# Patient Record
Sex: FEMALE | Race: BLACK OR AFRICAN AMERICAN | Employment: UNEMPLOYED | URBAN - METROPOLITAN AREA
[De-identification: names, ages, dates, MRNs, and addresses within clinical notes are randomized per-mention and may not be internally consistent; named-entity substitution may affect disease eponyms.]

---

## 2018-08-29 ENCOUNTER — HOSPITAL ENCOUNTER (EMERGENCY)
Age: 28
Discharge: HOME | End: 2018-08-29
Attending: EMERGENCY MEDICINE
Payer: COMMERCIAL

## 2018-08-29 VITALS
DIASTOLIC BLOOD PRESSURE: 65 MMHG | HEIGHT: 58 IN | SYSTOLIC BLOOD PRESSURE: 132 MMHG | RESPIRATION RATE: 17 BRPM | HEART RATE: 101 BPM | WEIGHT: 212 LBS | BODY MASS INDEX: 44.5 KG/M2 | TEMPERATURE: 98.6 F | OXYGEN SATURATION: 100 %

## 2018-08-29 DIAGNOSIS — M62.830 MUSCLE SPASM OF BACK: ICD-10-CM

## 2018-08-29 DIAGNOSIS — J45.21 EXACERBATION OF INTERMITTENT ASTHMA, UNSPECIFIED ASTHMA SEVERITY: Primary | ICD-10-CM

## 2018-08-29 PROCEDURE — 99283 EMERGENCY DEPT VISIT LOW MDM: CPT

## 2018-08-29 RX ORDER — BENZONATATE 100 MG/1
100 CAPSULE ORAL 3 TIMES DAILY PRN
Qty: 21 CAPSULE | Refills: 1 | Status: SHIPPED | OUTPATIENT
Start: 2018-08-29 | End: 2019-08-26

## 2018-08-29 RX ORDER — ALBUTEROL SULFATE 90 UG/1
2 AEROSOL, METERED RESPIRATORY (INHALATION) EVERY 6 HOURS PRN
Qty: 1 INHALER | Refills: 1 | Status: SHIPPED | OUTPATIENT
Start: 2018-08-29

## 2018-08-29 RX ORDER — IBUPROFEN 600 MG/1
600 TABLET ORAL EVERY 8 HOURS PRN
Qty: 20 TABLET | Refills: 0 | Status: SHIPPED | OUTPATIENT
Start: 2018-08-29 | End: 2019-08-26

## 2018-08-29 ASSESSMENT — PAIN SCALES - GENERAL: PAINLEVEL_OUTOF10: 5

## 2018-08-29 ASSESSMENT — PAIN DESCRIPTION - DESCRIPTORS: DESCRIPTORS: SPASM

## 2018-08-29 ASSESSMENT — PAIN DESCRIPTION - LOCATION: LOCATION: BACK

## 2018-08-29 ASSESSMENT — PAIN DESCRIPTION - PAIN TYPE: TYPE: ACUTE PAIN

## 2018-08-29 NOTE — ED TRIAGE NOTES
Cough with clear sputum. States feels like her chest is tight x3 days and feels like she needs her inhaler but she ran out >1 year ago. S/s worse when laying flat. C/o back spasms. Pt states this is the same s/s as when she has asthma attacks. It wakes her up in the middle of the night to where she has to drink water.

## 2018-08-31 NOTE — ED PROVIDER NOTES
TRIAGE CHIEF COMPLAINT:   Chief Complaint   Patient presents with    Asthma    Cough         HPI: Abdiel Gongora is a 29 y.o. female who presents to the Emergency Department with complaint of Dry cough for 3 days. She thinks her asthma is flaring up. Occasional shortness of breath. She states her breathing is slightly tight but she has no chest pain. She does complain of some upper back muscle spasms which come and go. She has no current symptoms. She states her symptoms are typically worse at night. She has been out of her rescue inhaler for over a year. She does not take any regular medications for her asthma. Denies sore throat or earache. She has no abdominal pain and vomiting or bowel complaint. REVIEW OF SYSTEMS:  6 systems reviewed. Pertinent positives per HPI. Otherwise noted to be negative. Nursing notes reviewed and agree with above. Past medical/surgical history reviewed. ALLERGIES   Allergies   Allergen Reactions    Norco [Hydrocodone-Acetaminophen] Hives    Pcn [Penicillins]     Sulfa Antibiotics     Sulfa Antibiotics Hives    Pcn [Penicillins] Rash         /65   Pulse 101   Temp 98.6 °F (37 °C) (Oral)   Resp 17   Ht 4' 10\" (1.473 m)   Wt 96.2 kg (212 lb)   LMP 07/26/2018   SpO2 100%   BMI 44.31 kg/m²   General:  No acute distress. Non toxic appearance  Head:   Normocephalic and atraumatic  Eyes:   Conjunctiva clear, EOM's intact. Sclera anicteric. ENT:   Mucous membranes moist.  TMs are normal.  No sinus tenderness. Oropharynx is clear. Neck:   Supple. No adenopathy or jugular venous distension  Lungs/Chest:  No respiratory distress  CVS:   Regular rate and rhythm  Abdomen:  Nontender  Extremities:  Full range of motion  Skin:   No rashes or lesions to exposed skin  Back:   Mild bilateral trapezius tenderness to palpation. No midline spinal, CVA or rib tenderness. Neuro:  Alert and OX3. Speech clear. No focal weakness.         Gait normal.  Psych:   Affect normal. Mood normal        RADIOLOGY:  NA    LAB      ED COURSE / MDM:  72-year-old female with history of asthma who has URI symptoms and slight asthma exacerbation. She does have some bilateral upper back muscle tenderness. Currently asymptomatic. She is afebrile. No respiratory distress. No chest pain. No clinical evidence for otitis media, strep throat or pneumonia. She was given a refill of her rescue inhaler along with Tessalon for cough and ibuprofen for muscle pain. I discussed with Destiny Armenta the results of evaluation in the Emergency Department, diagnosis, care and prognosis. The plan is to discharge to home. The patient is in agreement with the plan and questions have been answered. I also discussed with Destiny Armenta the reasons which may require a return visit and the importance of follow-up care.         FINAL IMPRESSION:  1 -- Asthma exacerbation  2 -- Muscle spasm of the back                  Roseanna Skiff, MD  08/31/18 2912

## 2018-11-17 ENCOUNTER — HOSPITAL ENCOUNTER (EMERGENCY)
Age: 28
Discharge: HOME | End: 2018-11-17
Payer: COMMERCIAL

## 2018-11-17 VITALS
WEIGHT: 205 LBS | SYSTOLIC BLOOD PRESSURE: 114 MMHG | OXYGEN SATURATION: 97 % | BODY MASS INDEX: 41.33 KG/M2 | HEIGHT: 59 IN | RESPIRATION RATE: 16 BRPM | HEART RATE: 85 BPM | TEMPERATURE: 98.8 F | DIASTOLIC BLOOD PRESSURE: 70 MMHG

## 2018-11-17 DIAGNOSIS — N12 PYELONEPHRITIS: Primary | ICD-10-CM

## 2018-11-17 LAB
A/G RATIO: 1.1 (ref 1.1–2.2)
ALBUMIN SERPL-MCNC: 4.3 G/DL (ref 3.4–5)
ALP BLD-CCNC: 58 U/L (ref 40–129)
ALT SERPL-CCNC: 9 U/L (ref 10–40)
ANION GAP SERPL CALCULATED.3IONS-SCNC: 8 MMOL/L (ref 3–16)
AST SERPL-CCNC: 12 U/L (ref 15–37)
BACTERIA: ABNORMAL /HPF
BASOPHILS ABSOLUTE: 0.1 K/UL (ref 0–0.2)
BASOPHILS RELATIVE PERCENT: 0.9 %
BILIRUB SERPL-MCNC: 0.4 MG/DL (ref 0–1)
BILIRUBIN URINE: NEGATIVE
BLOOD, URINE: NEGATIVE
BUN BLDV-MCNC: 13 MG/DL (ref 7–20)
CALCIUM SERPL-MCNC: 9.1 MG/DL (ref 8.3–10.6)
CHLORIDE BLD-SCNC: 103 MMOL/L (ref 99–110)
CLARITY: ABNORMAL
CO2: 25 MMOL/L (ref 21–32)
COLOR: YELLOW
COMMENT UA: ABNORMAL
CREAT SERPL-MCNC: 0.6 MG/DL (ref 0.6–1.1)
EOSINOPHILS ABSOLUTE: 0.1 K/UL (ref 0–0.6)
EOSINOPHILS RELATIVE PERCENT: 0.9 %
EPITHELIAL CELLS, UA: 3 /HPF (ref 0–5)
GFR AFRICAN AMERICAN: >60
GFR NON-AFRICAN AMERICAN: >60
GLOBULIN: 4 G/DL
GLUCOSE BLD-MCNC: 87 MG/DL (ref 70–99)
GLUCOSE URINE: NEGATIVE MG/DL
HCG(URINE) PREGNANCY TEST: NEGATIVE
HCT VFR BLD CALC: 37.9 % (ref 36–48)
HEMOGLOBIN: 12.9 G/DL (ref 12–16)
KETONES, URINE: ABNORMAL MG/DL
LEUKOCYTE ESTERASE, URINE: ABNORMAL
LIPASE: 19 U/L (ref 13–60)
LYMPHOCYTES ABSOLUTE: 2.8 K/UL (ref 1–5.1)
LYMPHOCYTES RELATIVE PERCENT: 42.6 %
MCH RBC QN AUTO: 31.6 PG (ref 26–34)
MCHC RBC AUTO-ENTMCNC: 34.1 G/DL (ref 31–36)
MCV RBC AUTO: 92.8 FL (ref 80–100)
MICROSCOPIC EXAMINATION: YES
MONOCYTES ABSOLUTE: 0.4 K/UL (ref 0–1.3)
MONOCYTES RELATIVE PERCENT: 5.7 %
MUCUS: ABNORMAL /LPF
NEUTROPHILS ABSOLUTE: 3.2 K/UL (ref 1.7–7.7)
NEUTROPHILS RELATIVE PERCENT: 49.9 %
NITRITE, URINE: NEGATIVE
PDW BLD-RTO: 13.6 % (ref 12.4–15.4)
PH UA: 6
PLATELET # BLD: 255 K/UL (ref 135–450)
PMV BLD AUTO: 8.5 FL (ref 5–10.5)
POTASSIUM REFLEX MAGNESIUM: 4 MMOL/L (ref 3.5–5.1)
PROTEIN UA: ABNORMAL MG/DL
RBC # BLD: 4.08 M/UL (ref 4–5.2)
RBC UA: 4 /HPF (ref 0–4)
SODIUM BLD-SCNC: 136 MMOL/L (ref 136–145)
SPECIFIC GRAVITY UA: >1.03
TOTAL PROTEIN: 8.3 G/DL (ref 6.4–8.2)
URINE REFLEX TO CULTURE: YES
URINE TYPE: ABNORMAL
UROBILINOGEN, URINE: 1 E.U./DL
WBC # BLD: 6.5 K/UL (ref 4–11)
WBC UA: 60 /HPF (ref 0–5)

## 2018-11-17 PROCEDURE — 80053 COMPREHEN METABOLIC PANEL: CPT

## 2018-11-17 PROCEDURE — 6360000002 HC RX W HCPCS: Performed by: PHYSICIAN ASSISTANT

## 2018-11-17 PROCEDURE — 85025 COMPLETE CBC W/AUTO DIFF WBC: CPT

## 2018-11-17 PROCEDURE — 84703 CHORIONIC GONADOTROPIN ASSAY: CPT

## 2018-11-17 PROCEDURE — 87086 URINE CULTURE/COLONY COUNT: CPT

## 2018-11-17 PROCEDURE — 96374 THER/PROPH/DIAG INJ IV PUSH: CPT

## 2018-11-17 PROCEDURE — 2500000003 HC RX 250 WO HCPCS: Performed by: PHYSICIAN ASSISTANT

## 2018-11-17 PROCEDURE — 96361 HYDRATE IV INFUSION ADD-ON: CPT

## 2018-11-17 PROCEDURE — 96375 TX/PRO/DX INJ NEW DRUG ADDON: CPT

## 2018-11-17 PROCEDURE — 83690 ASSAY OF LIPASE: CPT

## 2018-11-17 PROCEDURE — 6370000000 HC RX 637 (ALT 250 FOR IP): Performed by: PHYSICIAN ASSISTANT

## 2018-11-17 PROCEDURE — 99283 EMERGENCY DEPT VISIT LOW MDM: CPT

## 2018-11-17 PROCEDURE — 2580000003 HC RX 258: Performed by: PHYSICIAN ASSISTANT

## 2018-11-17 PROCEDURE — 81001 URINALYSIS AUTO W/SCOPE: CPT

## 2018-11-17 RX ORDER — ONDANSETRON 4 MG/1
4-8 TABLET, ORALLY DISINTEGRATING ORAL EVERY 12 HOURS PRN
Qty: 12 TABLET | Refills: 0 | Status: SHIPPED | OUTPATIENT
Start: 2018-11-17

## 2018-11-17 RX ORDER — KETOROLAC TROMETHAMINE 30 MG/ML
30 INJECTION, SOLUTION INTRAMUSCULAR; INTRAVENOUS ONCE
Status: COMPLETED | OUTPATIENT
Start: 2018-11-17 | End: 2018-11-17

## 2018-11-17 RX ORDER — 0.9 % SODIUM CHLORIDE 0.9 %
1000 INTRAVENOUS SOLUTION INTRAVENOUS ONCE
Status: COMPLETED | OUTPATIENT
Start: 2018-11-17 | End: 2018-11-17

## 2018-11-17 RX ORDER — PHENAZOPYRIDINE HYDROCHLORIDE 100 MG/1
200 TABLET, FILM COATED ORAL ONCE
Status: COMPLETED | OUTPATIENT
Start: 2018-11-17 | End: 2018-11-17

## 2018-11-17 RX ORDER — TRAMADOL HYDROCHLORIDE 50 MG/1
50 TABLET ORAL EVERY 4 HOURS PRN
Qty: 20 TABLET | Refills: 0 | Status: SHIPPED | OUTPATIENT
Start: 2018-11-17 | End: 2018-11-27

## 2018-11-17 RX ORDER — ONDANSETRON 2 MG/ML
4 INJECTION INTRAMUSCULAR; INTRAVENOUS ONCE
Status: COMPLETED | OUTPATIENT
Start: 2018-11-17 | End: 2018-11-17

## 2018-11-17 RX ORDER — CEFUROXIME AXETIL 500 MG/1
500 TABLET ORAL 2 TIMES DAILY
Qty: 20 TABLET | Refills: 0 | Status: SHIPPED | OUTPATIENT
Start: 2018-11-17 | End: 2018-11-27

## 2018-11-17 RX ADMIN — KETOROLAC TROMETHAMINE 30 MG: 30 INJECTION, SOLUTION INTRAMUSCULAR at 14:36

## 2018-11-17 RX ADMIN — ONDANSETRON 4 MG: 2 INJECTION INTRAMUSCULAR; INTRAVENOUS at 13:47

## 2018-11-17 RX ADMIN — PHENAZOPYRIDINE HYDROCHLORIDE 200 MG: 100 TABLET ORAL at 13:39

## 2018-11-17 RX ADMIN — SODIUM CHLORIDE 1000 ML: 9 INJECTION, SOLUTION INTRAVENOUS at 13:47

## 2018-11-17 RX ADMIN — CEFTRIAXONE 1 G: 10 INJECTION, POWDER, FOR SOLUTION INTRAVENOUS at 14:43

## 2018-11-17 ASSESSMENT — ENCOUNTER SYMPTOMS
COLOR CHANGE: 0
VOMITING: 1
NAUSEA: 1
ABDOMINAL PAIN: 1
DIARRHEA: 0
CONSTIPATION: 0
SHORTNESS OF BREATH: 0
BACK PAIN: 1

## 2018-11-17 ASSESSMENT — PAIN SCALES - GENERAL
PAINLEVEL_OUTOF10: 9
PAINLEVEL_OUTOF10: 8

## 2018-11-17 ASSESSMENT — PAIN DESCRIPTION - LOCATION: LOCATION: ABDOMEN;HEAD

## 2018-11-17 ASSESSMENT — PAIN DESCRIPTION - PAIN TYPE: TYPE: ACUTE PAIN

## 2018-11-18 LAB — URINE CULTURE, ROUTINE: NORMAL

## 2018-11-28 ENCOUNTER — APPOINTMENT (OUTPATIENT)
Dept: GENERAL RADIOLOGY | Age: 28
End: 2018-11-28
Payer: COMMERCIAL

## 2018-11-28 ENCOUNTER — APPOINTMENT (OUTPATIENT)
Dept: CT IMAGING | Age: 28
End: 2018-11-28
Payer: COMMERCIAL

## 2018-11-28 ENCOUNTER — HOSPITAL ENCOUNTER (EMERGENCY)
Age: 28
Discharge: HOME | End: 2018-11-28
Attending: EMERGENCY MEDICINE
Payer: COMMERCIAL

## 2018-11-28 VITALS
BODY MASS INDEX: 41.33 KG/M2 | OXYGEN SATURATION: 100 % | WEIGHT: 205 LBS | RESPIRATION RATE: 18 BRPM | HEIGHT: 59 IN | DIASTOLIC BLOOD PRESSURE: 76 MMHG | HEART RATE: 101 BPM | SYSTOLIC BLOOD PRESSURE: 131 MMHG | TEMPERATURE: 98.3 F

## 2018-11-28 DIAGNOSIS — R10.30 LOWER ABDOMINAL PAIN: Primary | ICD-10-CM

## 2018-11-28 DIAGNOSIS — R11.2 NON-INTRACTABLE VOMITING WITH NAUSEA, UNSPECIFIED VOMITING TYPE: ICD-10-CM

## 2018-11-28 DIAGNOSIS — R09.82 PND (POST-NASAL DRIP): ICD-10-CM

## 2018-11-28 DIAGNOSIS — N39.0 URINARY TRACT INFECTION IN FEMALE: ICD-10-CM

## 2018-11-28 LAB
A/G RATIO: 0.9 (ref 1.1–2.2)
ALBUMIN SERPL-MCNC: 4.2 G/DL (ref 3.4–5)
ALP BLD-CCNC: 68 U/L (ref 40–129)
ALT SERPL-CCNC: 11 U/L (ref 10–40)
ANION GAP SERPL CALCULATED.3IONS-SCNC: 11 MMOL/L (ref 3–16)
AST SERPL-CCNC: 16 U/L (ref 15–37)
BACTERIA: ABNORMAL /HPF
BASOPHILS ABSOLUTE: 0.1 K/UL (ref 0–0.2)
BASOPHILS RELATIVE PERCENT: 0.9 %
BILIRUB SERPL-MCNC: 0.3 MG/DL (ref 0–1)
BILIRUBIN URINE: NEGATIVE
BLOOD, URINE: NEGATIVE
BUN BLDV-MCNC: 10 MG/DL (ref 7–20)
CALCIUM SERPL-MCNC: 9.7 MG/DL (ref 8.3–10.6)
CASTS: ABNORMAL /LPF
CHLORIDE BLD-SCNC: 101 MMOL/L (ref 99–110)
CLARITY: ABNORMAL
CO2: 25 MMOL/L (ref 21–32)
COLOR: YELLOW
CREAT SERPL-MCNC: 0.7 MG/DL (ref 0.6–1.1)
EOSINOPHILS ABSOLUTE: 0.1 K/UL (ref 0–0.6)
EOSINOPHILS RELATIVE PERCENT: 1.8 %
EPITHELIAL CELLS, UA: 3 /HPF (ref 0–5)
GFR AFRICAN AMERICAN: >60
GFR NON-AFRICAN AMERICAN: >60
GLOBULIN: 4.6 G/DL
GLUCOSE BLD-MCNC: 93 MG/DL (ref 70–99)
GLUCOSE URINE: NEGATIVE MG/DL
HCG(URINE) PREGNANCY TEST: NEGATIVE
HCT VFR BLD CALC: 40.4 % (ref 36–48)
HEMOGLOBIN: 13.6 G/DL (ref 12–16)
KETONES, URINE: NEGATIVE MG/DL
LEUKOCYTE ESTERASE, URINE: ABNORMAL
LIPASE: 20 U/L (ref 13–60)
LYMPHOCYTES ABSOLUTE: 3.8 K/UL (ref 1–5.1)
LYMPHOCYTES RELATIVE PERCENT: 48.6 %
MCH RBC QN AUTO: 31.1 PG (ref 26–34)
MCHC RBC AUTO-ENTMCNC: 33.6 G/DL (ref 31–36)
MCV RBC AUTO: 92.8 FL (ref 80–100)
MICROSCOPIC EXAMINATION: YES
MONOCYTES ABSOLUTE: 0.4 K/UL (ref 0–1.3)
MONOCYTES RELATIVE PERCENT: 5.6 %
NEUTROPHILS ABSOLUTE: 3.3 K/UL (ref 1.7–7.7)
NEUTROPHILS RELATIVE PERCENT: 43.1 %
NITRITE, URINE: NEGATIVE
PDW BLD-RTO: 13.5 % (ref 12.4–15.4)
PH UA: 6
PLATELET # BLD: 257 K/UL (ref 135–450)
PLATELET SLIDE REVIEW: ADEQUATE
PMV BLD AUTO: 9.5 FL (ref 5–10.5)
POTASSIUM SERPL-SCNC: 4.6 MMOL/L (ref 3.5–5.1)
PROTEIN UA: NEGATIVE MG/DL
RBC # BLD: 4.35 M/UL (ref 4–5.2)
RBC # BLD: NORMAL 10*6/UL
RBC UA: 3 /HPF (ref 0–4)
SLIDE REVIEW: NORMAL
SODIUM BLD-SCNC: 137 MMOL/L (ref 136–145)
SPECIFIC GRAVITY UA: 1.03
TOTAL PROTEIN: 8.8 G/DL (ref 6.4–8.2)
URINE REFLEX TO CULTURE: YES
URINE TYPE: ABNORMAL
UROBILINOGEN, URINE: 1 E.U./DL
WBC # BLD: 7.7 K/UL (ref 4–11)
WBC UA: 52 /HPF (ref 0–5)

## 2018-11-28 PROCEDURE — 74176 CT ABD & PELVIS W/O CONTRAST: CPT

## 2018-11-28 PROCEDURE — 96374 THER/PROPH/DIAG INJ IV PUSH: CPT

## 2018-11-28 PROCEDURE — 81001 URINALYSIS AUTO W/SCOPE: CPT

## 2018-11-28 PROCEDURE — 84703 CHORIONIC GONADOTROPIN ASSAY: CPT

## 2018-11-28 PROCEDURE — 80053 COMPREHEN METABOLIC PANEL: CPT

## 2018-11-28 PROCEDURE — 85025 COMPLETE CBC W/AUTO DIFF WBC: CPT

## 2018-11-28 PROCEDURE — 96361 HYDRATE IV INFUSION ADD-ON: CPT

## 2018-11-28 PROCEDURE — 71046 X-RAY EXAM CHEST 2 VIEWS: CPT

## 2018-11-28 PROCEDURE — 6360000002 HC RX W HCPCS: Performed by: PHYSICIAN ASSISTANT

## 2018-11-28 PROCEDURE — 2580000003 HC RX 258: Performed by: PHYSICIAN ASSISTANT

## 2018-11-28 PROCEDURE — 99284 EMERGENCY DEPT VISIT MOD MDM: CPT

## 2018-11-28 PROCEDURE — 87086 URINE CULTURE/COLONY COUNT: CPT

## 2018-11-28 PROCEDURE — 83690 ASSAY OF LIPASE: CPT

## 2018-11-28 RX ORDER — PROMETHAZINE HYDROCHLORIDE 25 MG/1
25 TABLET ORAL EVERY 6 HOURS PRN
Qty: 10 TABLET | Refills: 0 | Status: SHIPPED | OUTPATIENT
Start: 2018-11-28 | End: 2018-12-05

## 2018-11-28 RX ORDER — KETOROLAC TROMETHAMINE 30 MG/ML
15 INJECTION, SOLUTION INTRAMUSCULAR; INTRAVENOUS ONCE
Status: COMPLETED | OUTPATIENT
Start: 2018-11-28 | End: 2018-11-28

## 2018-11-28 RX ORDER — NITROFURANTOIN 25; 75 MG/1; MG/1
100 CAPSULE ORAL 2 TIMES DAILY
Qty: 10 CAPSULE | Refills: 0 | Status: SHIPPED | OUTPATIENT
Start: 2018-11-28 | End: 2018-12-03

## 2018-11-28 RX ORDER — FLUTICASONE PROPIONATE 50 MCG
2 SPRAY, SUSPENSION (ML) NASAL DAILY
Qty: 1 BOTTLE | Refills: 0 | Status: SHIPPED | OUTPATIENT
Start: 2018-11-28

## 2018-11-28 RX ORDER — DICYCLOMINE HYDROCHLORIDE 10 MG/1
20 CAPSULE ORAL EVERY 6 HOURS PRN
Qty: 20 CAPSULE | Refills: 0 | Status: SHIPPED | OUTPATIENT
Start: 2018-11-28

## 2018-11-28 RX ORDER — 0.9 % SODIUM CHLORIDE 0.9 %
1000 INTRAVENOUS SOLUTION INTRAVENOUS ONCE
Status: COMPLETED | OUTPATIENT
Start: 2018-11-28 | End: 2018-11-28

## 2018-11-28 RX ADMIN — KETOROLAC TROMETHAMINE 15 MG: 30 INJECTION, SOLUTION INTRAMUSCULAR at 18:32

## 2018-11-28 RX ADMIN — SODIUM CHLORIDE 1000 ML: 9 INJECTION, SOLUTION INTRAVENOUS at 18:32

## 2018-11-28 ASSESSMENT — PAIN DESCRIPTION - FREQUENCY: FREQUENCY: CONTINUOUS

## 2018-11-28 ASSESSMENT — PAIN DESCRIPTION - LOCATION
LOCATION: ABDOMEN
LOCATION: ABDOMEN

## 2018-11-28 ASSESSMENT — PAIN DESCRIPTION - PAIN TYPE
TYPE: ACUTE PAIN;CHRONIC PAIN
TYPE: ACUTE PAIN

## 2018-11-28 ASSESSMENT — PAIN DESCRIPTION - PROGRESSION: CLINICAL_PROGRESSION: GRADUALLY IMPROVING

## 2018-11-28 ASSESSMENT — PAIN SCALES - GENERAL
PAINLEVEL_OUTOF10: 7
PAINLEVEL_OUTOF10: 6

## 2018-11-29 LAB — URINE CULTURE, ROUTINE: NORMAL

## 2019-06-02 ENCOUNTER — APPOINTMENT (OUTPATIENT)
Dept: CT IMAGING | Age: 29
End: 2019-06-02
Payer: COMMERCIAL

## 2019-06-02 ENCOUNTER — HOSPITAL ENCOUNTER (EMERGENCY)
Age: 29
Discharge: HOME | End: 2019-06-02
Payer: COMMERCIAL

## 2019-06-02 ENCOUNTER — APPOINTMENT (OUTPATIENT)
Dept: GENERAL RADIOLOGY | Age: 29
End: 2019-06-02
Payer: COMMERCIAL

## 2019-06-02 VITALS
RESPIRATION RATE: 16 BRPM | DIASTOLIC BLOOD PRESSURE: 77 MMHG | WEIGHT: 200 LBS | HEART RATE: 84 BPM | SYSTOLIC BLOOD PRESSURE: 138 MMHG | TEMPERATURE: 98.7 F | OXYGEN SATURATION: 100 % | BODY MASS INDEX: 40.4 KG/M2
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
## 2019-06-02 DIAGNOSIS — S00.83XA FACIAL CONTUSION, INITIAL ENCOUNTER: ICD-10-CM

## 2019-06-02 DIAGNOSIS — S70.01XA CONTUSION OF RIGHT HIP, INITIAL ENCOUNTER: ICD-10-CM

## 2019-06-02 DIAGNOSIS — S20.211A RIB CONTUSION, RIGHT, INITIAL ENCOUNTER: ICD-10-CM

## 2019-06-02 LAB
A/G RATIO: 1.1 (ref 1.1–2.2)
ALBUMIN SERPL-MCNC: 4 G/DL (ref 3.4–5)
ALP BLD-CCNC: 53 U/L (ref 40–129)
ALT SERPL-CCNC: 8 U/L (ref 10–40)
ANION GAP SERPL CALCULATED.3IONS-SCNC: 9 MMOL/L (ref 3–16)
AST SERPL-CCNC: 17 U/L (ref 15–37)
BASOPHILS ABSOLUTE: 0 K/UL (ref 0–0.2)
BASOPHILS RELATIVE PERCENT: 0 %
BILIRUB SERPL-MCNC: <0.2 MG/DL (ref 0–1)
BUN BLDV-MCNC: 8 MG/DL (ref 7–20)
CALCIUM SERPL-MCNC: 9.2 MG/DL (ref 8.3–10.6)
CHLORIDE BLD-SCNC: 105 MMOL/L (ref 99–110)
CO2: 26 MMOL/L (ref 21–32)
CREAT SERPL-MCNC: 0.7 MG/DL (ref 0.6–1.1)
EOSINOPHILS ABSOLUTE: 0.1 K/UL (ref 0–0.6)
EOSINOPHILS RELATIVE PERCENT: 1 %
GFR AFRICAN AMERICAN: >60
GFR NON-AFRICAN AMERICAN: >60
GLOBULIN: 3.7 G/DL
GLUCOSE BLD-MCNC: 105 MG/DL (ref 70–99)
HCG QUALITATIVE: NEGATIVE
HCT VFR BLD CALC: 37.4 % (ref 36–48)
HEMOGLOBIN: 12.7 G/DL (ref 12–16)
LYMPHOCYTES ABSOLUTE: 4.3 K/UL (ref 1–5.1)
LYMPHOCYTES RELATIVE PERCENT: 70 %
MCH RBC QN AUTO: 31.5 PG (ref 26–34)
MCHC RBC AUTO-ENTMCNC: 33.8 G/DL (ref 31–36)
MCV RBC AUTO: 93.1 FL (ref 80–100)
MONOCYTES ABSOLUTE: 0.4 K/UL (ref 0–1.3)
MONOCYTES RELATIVE PERCENT: 7 %
NEUTROPHILS ABSOLUTE: 1.4 K/UL (ref 1.7–7.7)
NEUTROPHILS RELATIVE PERCENT: 22 %
PDW BLD-RTO: 13.7 % (ref 12.4–15.4)
PLATELET # BLD: 250 K/UL (ref 135–450)
PLATELET SLIDE REVIEW: ADEQUATE
PMV BLD AUTO: 8.6 FL (ref 5–10.5)
POTASSIUM REFLEX MAGNESIUM: 4.9 MMOL/L (ref 3.5–5.1)
RBC # BLD: 4.02 M/UL (ref 4–5.2)
SLIDE REVIEW: ABNORMAL
SODIUM BLD-SCNC: 140 MMOL/L (ref 136–145)
TOTAL PROTEIN: 7.7 G/DL (ref 6.4–8.2)
WBC # BLD: 6.2 K/UL (ref 4–11)

## 2019-06-02 PROCEDURE — 96374 THER/PROPH/DIAG INJ IV PUSH: CPT

## 2019-06-02 PROCEDURE — 70450 CT HEAD/BRAIN W/O DYE: CPT

## 2019-06-02 PROCEDURE — 85025 COMPLETE CBC W/AUTO DIFF WBC: CPT

## 2019-06-02 PROCEDURE — 72125 CT NECK SPINE W/O DYE: CPT

## 2019-06-02 PROCEDURE — 73030 X-RAY EXAM OF SHOULDER: CPT

## 2019-06-02 PROCEDURE — 73502 X-RAY EXAM HIP UNI 2-3 VIEWS: CPT

## 2019-06-02 PROCEDURE — 84703 CHORIONIC GONADOTROPIN ASSAY: CPT

## 2019-06-02 PROCEDURE — 70486 CT MAXILLOFACIAL W/O DYE: CPT

## 2019-06-02 PROCEDURE — 99284 EMERGENCY DEPT VISIT MOD MDM: CPT

## 2019-06-02 PROCEDURE — 80053 COMPREHEN METABOLIC PANEL: CPT

## 2019-06-02 PROCEDURE — 96375 TX/PRO/DX INJ NEW DRUG ADDON: CPT

## 2019-06-02 PROCEDURE — 6360000002 HC RX W HCPCS: Performed by: PHYSICIAN ASSISTANT

## 2019-06-02 PROCEDURE — 71250 CT THORAX DX C-: CPT

## 2019-06-02 RX ORDER — TRAMADOL HYDROCHLORIDE 50 MG/1
50 TABLET ORAL EVERY 4 HOURS PRN
Qty: 30 TABLET | Refills: 0 | Status: SHIPPED | OUTPATIENT
Start: 2019-06-02 | End: 2019-06-07

## 2019-06-02 RX ORDER — MORPHINE SULFATE 4 MG/ML
4 INJECTION, SOLUTION INTRAMUSCULAR; INTRAVENOUS ONCE
Status: COMPLETED | OUTPATIENT
Start: 2019-06-02 | End: 2019-06-02

## 2019-06-02 RX ORDER — NAPROXEN 500 MG/1
500 TABLET ORAL 2 TIMES DAILY WITH MEALS
Qty: 30 TABLET | Refills: 0 | Status: SHIPPED | OUTPATIENT
Start: 2019-06-02 | End: 2019-08-26

## 2019-06-02 RX ORDER — ALBUTEROL SULFATE 90 UG/1
AEROSOL, METERED RESPIRATORY (INHALATION)
Qty: 1 INHALER | Refills: 0 | Status: SHIPPED | OUTPATIENT
Start: 2019-06-02

## 2019-06-02 RX ORDER — ONDANSETRON 4 MG/1
4 TABLET, ORALLY DISINTEGRATING ORAL EVERY 8 HOURS PRN
Qty: 20 TABLET | Refills: 0 | Status: SHIPPED | OUTPATIENT
Start: 2019-06-02

## 2019-06-02 RX ORDER — ONDANSETRON 2 MG/ML
4 INJECTION INTRAMUSCULAR; INTRAVENOUS ONCE
Status: COMPLETED | OUTPATIENT
Start: 2019-06-02 | End: 2019-06-02

## 2019-06-02 RX ADMIN — ONDANSETRON 4 MG: 2 INJECTION INTRAMUSCULAR; INTRAVENOUS at 19:58

## 2019-06-02 RX ADMIN — MORPHINE SULFATE 4 MG: 4 INJECTION INTRAVENOUS at 19:58

## 2019-06-02 ASSESSMENT — ENCOUNTER SYMPTOMS
VOMITING: 0
DIARRHEA: 0
RESPIRATORY NEGATIVE: 1
CONSTIPATION: 0
COUGH: 0
ABDOMINAL PAIN: 0
NAUSEA: 0
BACK PAIN: 1
COLOR CHANGE: 0
SHORTNESS OF BREATH: 0
PHOTOPHOBIA: 0

## 2019-06-02 ASSESSMENT — PAIN SCALES - GENERAL
PAINLEVEL_OUTOF10: 8
PAINLEVEL_OUTOF10: 8

## 2019-06-02 ASSESSMENT — PAIN DESCRIPTION - PAIN TYPE: TYPE: ACUTE PAIN

## 2019-06-03 NOTE — ED PROVIDER NOTES
[facility]        Pt Name: Edwardo Romero  MRN: [id number]  [place] 1990  Date of evaluation: 6/2/2019  Provider: MORA Ruiz  PCP: [facility]    This patient was not seen and evaluated by the attending physician but available for consultation as needed. CHIEF COMPLAINT       Chief Complaint   Patient presents with    Fall     pt into ER from home where hes fell cleaning 2 days ago- reporting everything hurts on right side       HISTORY OF PRESENT ILLNESS   (Location/Symptom, Timing/Onset, Context/Setting, Quality, Duration, Modifying Factors, Severity)  Note limiting factors. Edwardo Romero is a 34 y.o. female with past medical history of anxiety, asthma, hypertension who presents ED plan of a fall. Patient that she was cleaning 2 days ago. Patient states she tripped and fell while cleaning and fell down approximately 20 stairs. Patient states she hit her head. Denies loss of consciousness. States since and has had pain to her right shoulder, right hip, right-sided rib cage, head, right sided face/jaw. Patient states she had been trying to manage with Motrin and Tylenol at home but has not helped so came to ED for further evaluation treatment. Denies blood thinning medication usage. Denies epistaxis, trismus, drooling, dental abnormality, visual changes, speech disturbances, numbness/tingling, lightheadedness/dizziness, shortness of breath, abdominal pain, nausea/vomiting, urinary symptoms or changes in bowel movements. States he has been walking with a limp. States decreased range of motion and strength due to pain. Denies edema, ecchymosis, erythema or warmth. Denies abrasion or laceration. States aching pain rated 10/10 to the areas listed above.   Denies significant history of head trauma or concussions in the past.    Nursing Notes were all reviewed and agreed with or any disagreements were addressed  in the HPI. REVIEW OF SYSTEMS    (2-9 systems for level 4, 10 or more for level 5)     Review of Systems   Constitutional: Negative for activity change, appetite change, chills and fever. Eyes: Negative for photophobia and visual disturbance. Respiratory: Negative. Negative for cough and shortness of breath. Cardiovascular: Positive for chest pain. Negative for palpitations and leg swelling. Gastrointestinal: Negative for abdominal pain, constipation, diarrhea, nausea and vomiting. Genitourinary: Negative for difficulty urinating, dysuria and flank pain. Musculoskeletal: Positive for arthralgias, back pain and myalgias. Negative for gait problem, joint swelling, neck pain and neck stiffness. Skin: Negative for color change, pallor, rash and wound. Neurological: Positive for headaches. Negative for dizziness, tremors, seizures, syncope, facial asymmetry, speech difficulty, weakness, light-headedness and numbness. Positives and Pertinent negatives as per HPI. Except as noted abovein the ROS, all other systems were reviewed and negative.        PAST MEDICAL HISTORY     Past Medical History:   Diagnosis Date    Anxiety     Asthma     Guillain-Naselle ([facility] [address]     Headache     Headaches     Hypertension     Maternal blood transfusion 2009         SURGICAL HISTORY     Past Surgical History:   Procedure Laterality Date     SECTION       SECTION  2009    CYST REMOVAL      from lower back         [address]       Discharge Medication List as of 2019 10:44 PM      CONTINUE these medications which have NOT CHANGED    Details   fluticasone (FLONASE) 50 MCG/ACT nasal spray 2 sprays by Nasal route daily, Disp-1 Bottle, R-0Print      dicyclomine (BENTYL) 10 MG capsule Take 2 capsules by mouth every 6 hours as needed (cramps), Disp-20 capsule, R-0Print      !! ondansetron (ZOFRAN ODT) 4 MG disintegrating tablet Take 1-2 tablets by mouth every 12 hours as needed for Nausea May Sub regular tablet (non-ODT) if insurance does not cover ODT., Disp-12 tablet, R-0Print      !! albuterol sulfate HFA (PROAIR HFA) 108 (90 Base) MCG/ACT inhaler Inhale 2 puffs into the lungs every 6 hours as needed for Wheezing, Disp-1 Inhaler, R-1Print      benzonatate (TESSALON PERLES) 100 MG capsule Take 1 capsule by mouth 3 times daily as needed for Cough, Disp-21 capsule, R-1Print      ibuprofen (IBU) 600 MG tablet Take 1 tablet by mouth every 8 hours as needed for Pain, Disp-20 tablet, R-0Print       !! - Potential duplicate medications found. Please discuss with provider. ALLERGIES     Influenza vaccines; Norco [hydrocodone-acetaminophen]; Pcn [penicillins]; Sulfa antibiotics; Sulfa antibiotics; and Pcn [penicillins]    FAMILYHISTORY     History reviewed. No pertinent family history. SOCIAL HISTORY       Social History     Socioeconomic History    Marital status: Single     Spouse name: None    Number of children: None    Years of education: None    Highest education level: None   Occupational History    None   Social Needs    Financial resource strain: None    Food insecurity:     Worry: None     Inability: None    Transportation needs:     Medical: None     Non-medical: None   Tobacco Use    Smoking status: Never Smoker    Smokeless tobacco: Never Used   Substance and Sexual Activity    Alcohol use: No     Comment: occ.     Drug use: No    Sexual activity: None   Lifestyle    Physical activity:     Days per week: None     Minutes per session: None    Stress: None   Relationships    Social connections:     Talks on phone: None     Gets together: None     Attends Quaker service: None     Active member of club or organization: None     Attends meetings of clubs or organizations: None     Relationship status: None    Intimate partner violence:     Fear of current or ex partner: None     Emotionally abused: None     Physically abused: None Forced sexual activity: None   Other Topics Concern    None   Social History Narrative    ** Merged History Encounter **            SCREENINGS             PHYSICAL EXAM    (up to 7 for level 4, 8 or more for level 5)     ED Triage Vitals [06/02/19 1858]   BP Temp Temp Source Pulse Resp SpO2 Height Weight   129/80 98.8 °F (37.1 °C) Oral 95 16 98 % -- 200 lb (90.7 kg)       Physical Exam   Constitutional: She is oriented to person, place, and time. She appears well-developed and well-nourished. No distress. HENT:   Head: Normocephalic and atraumatic. Right Ear: External ear normal.   Left Ear: External ear normal.   Mouth/Throat: Oropharynx is clear and moist. No oropharyngeal exudate. Atraumatic. No raccoon eyes or Monae sign. No epistaxis. No septal hematoma. No trismus or jaw malocclusion. No evidence of LeFort fracture. Tenderness to palpation over the right-sided mandible. No hemotympanum. Eyes: Pupils are equal, round, and reactive to light. Conjunctivae and EOM are normal. Right eye exhibits no discharge. Left eye exhibits no discharge. Neck: Normal range of motion. Neck supple. Cardiovascular: Normal rate, regular rhythm, normal heart sounds and intact distal pulses. Exam reveals no gallop and no friction rub. No murmur heard. Pulmonary/Chest: Effort normal and breath sounds normal. No stridor. No respiratory distress. She has no wheezes. She has no rales. She exhibits tenderness. Tender to palpation of the right lateral rib cage. No crepitus or step-off. No abrasion or laceration. No edema, ecchymoses or erythema noted. Abdominal: Soft. She exhibits no distension and no mass. There is no tenderness. There is no rigidity, no rebound, no guarding and no CVA tenderness. Musculoskeletal: Normal range of motion. No TTP to the cervical, thoracic or lumbar midline spine. No anterior chest wall tenderness. No pelvis instability. Tenderness over the right lateral hip.   Full range of motion and strength to the upper and lower extremities throughout. Distal neurovascular intact. Gait deferred. Lymphadenopathy:     She has no cervical adenopathy. Neurological: She is alert and oriented to person, place, and time. She has normal strength. No cranial nerve deficit or sensory deficit. GCS eye subscore is 4. GCS verbal subscore is 5. GCS motor subscore is 6. Gait deferred. Skin: Skin is warm and dry. No rash noted. She is not diaphoretic. No erythema. No pallor. Psychiatric: She has a normal mood and affect. Her behavior is normal.       DIAGNOSTIC RESULTS   LABS:    Labs Reviewed   CBC WITH AUTO DIFFERENTIAL - Abnormal; Notable for the following components:       Result Value    Neutrophils # 1.4 (*)     All other components within normal limits    Narrative:     Performed at:  [facility] [address], [place], [address] [place]   Phone [phone]   COMPREHENSIVE METABOLIC PANEL W/ REFLEX TO MG FOR LOW K - Abnormal; Notable for the following components:    Glucose 105 (*)     ALT 8 (*)     All other components within normal limits    Narrative:     Performed at:  [facility] [address], [place], [facility][note 3] [place]   Phone [phone]   HCG, SERUM, QUALITATIVE    Narrative:     Performed at:  [facility] [address], [place], [facility][note 3] [place]   Phone [phone]       All other labs were within normal range or not returned as of this dictation. EKG: All EKG's are interpreted by the Emergency Department Physician who either signs orCo-signs this chart in the absence of a cardiologist.  Please see their note for interpretation of EKG.       RADIOLOGY:   Non-plain film images such as CT, Ultrasound and MRI are read by the radiologist. Plain radiographic images are visualized andpreliminarily interpreted by the  ED Provider with the below findings:        Interpretation perthe Radiologist below, if available at the time of this note:    CT Facial Bones WO Contrast   Final Result   No acute traumatic injury of the facial bones. CT CHEST WO CONTRAST   Final Result   Negative CT of the chest as discussed. CT Cervical Spine WO Contrast   Final Result   Head: No acute intracranial abnormality. Cervical spine: No acute abnormality of the cervical spine. CT Head WO Contrast   Final Result   Head: No acute intracranial abnormality. Cervical spine: No acute abnormality of the cervical spine. XR SHOULDER RIGHT (MIN 2 VIEWS)   Final Result   Right shoulder: Negative, no fracture or dislocation. Pelvis and right hip: Negative, no fracture or dislocation. XR HIP 2-3 VW W PELVIS RIGHT   Final Result   Right shoulder: Negative, no fracture or dislocation. Pelvis and right hip: Negative, no fracture or dislocation. Xr Shoulder Right (min 2 Views)    Result Date: 6/2/2019  EXAMINATION: 3 XRAY VIEWS OF THE RIGHT SHOULDER; ONE XRAY VIEW OF THE PELVIS AND TWO XRAY VIEWS RIGHT HIP 6/2/2019 7:21 pm COMPARISON: None. HISTORY: ORDERING SYSTEM PROVIDED HISTORY: inj TECHNOLOGIST PROVIDED HISTORY: Reason for exam:->inj Ordering Physician Provided Reason for Exam: Catherine Spar 2 days ago. Pain to right shoulder-superior aspect near neck Acuity: Acute Type of Exam: Initial; ORDERING SYSTEM PROVIDED HISTORY: inj TECHNOLOGIST PROVIDED HISTORY: Reason for exam:->inj Ordering Physician Provided Reason for Exam: fell 2 days ago Acuity: Acute Type of Exam: Initial FINDINGS: Right shoulder: No fracture or dislocation, or other abnormality. Pelvis and right hip: No fracture or dislocation or other abnormality of the pelvis or right hip. There is mild diastasis of the pubic symphysis, unchanged from a CT examination performed in November 2018     Right shoulder: Negative, no fracture or dislocation. Pelvis and right hip: Negative, no fracture or dislocation. Ct Head Wo Contrast    Result Date: 6/2/2019  EXAMINATION: CT OF THE HEAD WITHOUT CONTRAST; CT OF THE CERVICAL SPINE WITHOUT CONTRAST 6/2/2019 8:54 pm TECHNIQUE: CT of the head was performed without the administration of intravenous contrast. Dose modulation, iterative reconstruction, and/or weight based adjustment of the mA/kV was utilized to reduce the radiation dose to as low as reasonably achievable.; CT of the cervical spine was performed without the administration of intravenous contrast. Multiplanar reformatted images are provided for review. Dose modulation, iterative reconstruction, and/or weight based adjustment of the mA/kV was utilized to reduce the radiation dose to as low as reasonably achievable. COMPARISON: None. HISTORY: ORDERING SYSTEM PROVIDED HISTORY: fall TECHNOLOGIST PROVIDED HISTORY: Has a \"code stroke\" or \"stroke alert\" been called? ->No Ordering Physician Provided Reason for Exam: Fall (pt into ER from home where hes fell cleaning 2 days ago- reporting everything hurts on right side) Acuity: Acute Type of Exam: Initial; ORDERING SYSTEM PROVIDED HISTORY: fall TECHNOLOGIST PROVIDED HISTORY: Ordering Physician Provided Reason for Exam: Fall (pt into ER from home where hes fell cleaning 2 days ago- reporting everything hurts on right side) Acuity: Acute Type of Exam: Initial FINDINGS: BRAIN/VENTRICLES: There is no acute intracranial hemorrhage, mass effect or midline shift. No abnormal extra-axial fluid collection. The gray-white differentiation is maintained without evidence of an acute infarct. There is no evidence of hydrocephalus. There is a cavum septum pellucidum noted incidentally. ORBITS: The visualized portion of the orbits demonstrate no acute abnormality. SINUSES: The visualized paranasal sinuses and mastoid air cells demonstrate no acute abnormality. SOFT TISSUES/SKULL:  No acute abnormality of the visualized skull or soft tissues.  CERVICAL SPINE: No fracture demonstrated throughout the cervical spine or visualized thoracic spine. No prevertebral soft tissue swelling or other acute traumatic findings surrounding the cervical spine. No significant degenerative changes. Head: No acute intracranial abnormality. Cervical spine: No acute abnormality of the cervical spine. Ct Facial Bones Wo Contrast    Result Date: 6/2/2019  EXAMINATION: CT OF THE FACE WITHOUT CONTRAST  6/2/2019 9:54 pm TECHNIQUE: CT of the face was performed without the administration of intravenous contrast. Multiplanar reformatted images are provided for review. Dose modulation, iterative reconstruction, and/or weight based adjustment of the mA/kV was utilized to reduce the radiation dose to as low as reasonably achievable. COMPARISON: None HISTORY: ORDERING SYSTEM PROVIDED HISTORY: fall - inj TECHNOLOGIST PROVIDED HISTORY: Acuity: Acute Type of Exam: Initial FINDINGS: FACIAL BONES:  The maxilla, pterygoid plates and zygomatic arches are intact. The mandible is intact. The mandibular condyles are normally situated. The nasal bones and maxillary nasal processes are intact. ORBITS:  The globes appear intact. The extraocular muscles, optic nerve sheath complexes and lacrimal glands appear unremarkable. No retrobulbar hematoma or mass is seen. The orbital walls and rims are intact. SINUSES/MASTOIDS:  The paranasal sinuses and mastoid air cells are well aerated. No acute fracture is seen. SOFT TISSUES:  No appreciable facial soft tissue swelling is seen. No acute traumatic injury of the facial bones. Ct Chest Wo Contrast    Result Date: 6/2/2019  EXAMINATION: CT OF THE CHEST WITHOUT CONTRAST 6/2/2019 8:54 pm TECHNIQUE: CT of the chest was performed without the administration of intravenous contrast. Multiplanar reformatted images are provided for review.  Dose modulation, iterative reconstruction, and/or weight based adjustment of the mA/kV was utilized to reduce the radiation dose to as low as reasonably achievable. COMPARISON: None. HISTORY: ORDERING SYSTEM PROVIDED HISTORY: fall TECHNOLOGIST PROVIDED HISTORY: Ordering Physician Provided Reason for Exam: Fall (pt into ER from home where hes fell cleaning 2 days ago- reporting everything hurts on right side) Acuity: Acute Type of Exam: Initial FINDINGS: Mediastinum: No acute traumatic noncontrast findings of the mediastinal structures. No mediastinal air or fluid collection. No pericardial fluid. Lungs/pleura: Lungs are clear. No pleural effusion or pneumothorax Upper Abdomen: No evidence of trauma on noncontrast evaluation of the upper abdomen. Soft Tissues/Bones: No chest wall hematoma, no acute traumatic findings. No fracture demonstrated. Negative CT of the chest as discussed. Ct Cervical Spine Wo Contrast    Result Date: 6/2/2019  EXAMINATION: CT OF THE HEAD WITHOUT CONTRAST; CT OF THE CERVICAL SPINE WITHOUT CONTRAST 6/2/2019 8:54 pm TECHNIQUE: CT of the head was performed without the administration of intravenous contrast. Dose modulation, iterative reconstruction, and/or weight based adjustment of the mA/kV was utilized to reduce the radiation dose to as low as reasonably achievable.; CT of the cervical spine was performed without the administration of intravenous contrast. Multiplanar reformatted images are provided for review. Dose modulation, iterative reconstruction, and/or weight based adjustment of the mA/kV was utilized to reduce the radiation dose to as low as reasonably achievable. COMPARISON: None. HISTORY: ORDERING SYSTEM PROVIDED HISTORY: fall TECHNOLOGIST PROVIDED HISTORY: Has a \"code stroke\" or \"stroke alert\" been called? ->No Ordering Physician Provided Reason for Exam: Fall (pt into ER from home where hes fell cleaning 2 days ago- reporting everything hurts on right side) Acuity: Acute Type of Exam: Initial; ORDERING SYSTEM PROVIDED HISTORY: fall TECHNOLOGIST PROVIDED HISTORY: Ordering Physician Provided Reason for Exam: Fall (pt into ER from home where hes fell cleaning 2 days ago- reporting everything hurts on right side) Acuity: Acute Type of Exam: Initial FINDINGS: BRAIN/VENTRICLES: There is no acute intracranial hemorrhage, mass effect or midline shift. No abnormal extra-axial fluid collection. The gray-white differentiation is maintained without evidence of an acute infarct. There is no evidence of hydrocephalus. There is a cavum septum pellucidum noted incidentally. ORBITS: The visualized portion of the orbits demonstrate no acute abnormality. SINUSES: The visualized paranasal sinuses and mastoid air cells demonstrate no acute abnormality. SOFT TISSUES/SKULL:  No acute abnormality of the visualized skull or soft tissues. CERVICAL SPINE: No fracture demonstrated throughout the cervical spine or visualized thoracic spine. No prevertebral soft tissue swelling or other acute traumatic findings surrounding the cervical spine. No significant degenerative changes. Head: No acute intracranial abnormality. Cervical spine: No acute abnormality of the cervical spine. Xr Hip 2-3 Vw W Pelvis Right    Result Date: 6/2/2019  EXAMINATION: 3 XRAY VIEWS OF THE RIGHT SHOULDER; ONE XRAY VIEW OF THE PELVIS AND TWO XRAY VIEWS RIGHT HIP 6/2/2019 7:21 pm COMPARISON: None. HISTORY: ORDERING SYSTEM PROVIDED HISTORY: inj TECHNOLOGIST PROVIDED HISTORY: Reason for exam:->inj Ordering Physician Provided Reason for Exam: Ebb Fried 2 days ago. Pain to right shoulder-superior aspect near neck Acuity: Acute Type of Exam: Initial; ORDERING SYSTEM PROVIDED HISTORY: inj TECHNOLOGIST PROVIDED HISTORY: Reason for exam:->inj Ordering Physician Provided Reason for Exam: fell 2 days ago Acuity: Acute Type of Exam: Initial FINDINGS: Right shoulder: No fracture or dislocation, or other abnormality. Pelvis and right hip: No fracture or dislocation or other abnormality of the pelvis or right hip.   There is mild diastasis of the pubic symphysis, unchanged from a CT examination performed in November 2018     Right shoulder: Negative, no fracture or dislocation. Pelvis and right hip: Negative, no fracture or dislocation. PROCEDURES   Unless otherwise noted below, none     Procedures    CRITICAL CARE TIME   N/A    CONSULTS:  None      EMERGENCY DEPARTMENT COURSE and DIFFERENTIALDIAGNOSIS/MDM:   Vitals:    Vitals:    06/02/19 2145 06/02/19 2200 06/02/19 2215 06/02/19 2230   BP:  (!) 145/92  138/77   Pulse:    84   Resp:    16   Temp:    98.7 °F (37.1 °C)   TempSrc:    Oral   SpO2: 100% 100% 100% 100%   Weight:           Patient was given thefollowing medications:  Medications   morphine injection 4 mg (4 mg Intravenous Given 6/2/19 1958)   ondansetron (ZOFRAN) injection 4 mg (4 mg Intravenous Given 6/2/19 1958)       Patient is a 60-year-old female who presents ED with minimal fall. Patient states she had a fall 2 days ago. States she was cleaning when she tripped and fell. States she fell down approximately 20 steps. Patient states she hit her head. Patient states she did not lose consciousness. States complaining of pain to her head into her right sided jaw/rib/hip. IV established and blood work obtained. Pharynx is negative. CBC showed normal hemoglobin and white count. Platelets normal.  CMP unremarkable. Given patient's significant fall with symptomatology CT scan of the facial bones obtained and showed no acute abnormality. CT of the chest unremarkable. CT of the cervical spine unremarkable. CT of the head unremarkable. X-ray of the right hip and right shoulder obtained and showed no acute abnormality. Given history and physical examination suffering from fall with associated contusions and closed head injury. Instructed on brain rest.  Given prescription for pain medication for home. Follow up with PCP. Return ED for any worsening symptoms.   Low station for subdural hematoma, subarachnoid hemorrhage, intracranial hemorrhage, intracranial mass, acute fracture/dislocation, intrathoracic abnormality, surgical abdomen or other emergent condition at this time. FINAL IMPRESSION      1. Fall down steps, initial encounter    2. Facial contusion, initial encounter    3. Rib contusion, right, initial encounter    4. Contusion of right hip, initial encounter          DISPOSITION/PLAN   DISPOSITION Decision To Discharge 06/02/2019 10:30:53 PM      PATIENT REFERREDTO:  [facility]    Schedule an appointment as soon as possible for a visit   For a Re-check in  3-5    days. [facility] Emergency Department  [address]  [address] [facility] [address]  [phone]  Go to   As needed, If symptoms worsen      DISCHARGE MEDICATIONS:  Discharge Medication List as of 6/2/2019 10:44 PM      START taking these medications    Details   traMADol (ULTRAM) 50 MG tablet Take 1 tablet by mouth every 4 hours as needed for Pain for up to 5 days. Intended supply: 5 days. Take lowest dose possible to manage pain, Disp-30 tablet, R-0Print      !! ondansetron (ZOFRAN ODT) 4 MG disintegrating tablet Take 1 tablet by mouth every 8 hours as needed for Nausea, Disp-20 tablet, R-0Print      naproxen (NAPROSYN) 500 MG tablet Take 1 tablet by mouth 2 times daily (with meals), Disp-30 tablet, R-0Print      !! albuterol sulfate  (90 Base) MCG/ACT inhaler Use 2 puffs 4 times daily for 7 days then as needed for wheezing. Dispense with Spacer and instruct in use. At patient's preference may use 60 dose MDI. May Sub Pro-Air or Proventil as needed per insurance., Disp-1 Inhaler, R-0, DAWPrint       !! - Potential duplicate medications found. Please discuss with provider.           DISCONTINUED MEDICATIONS:  Discharge Medication List as of 6/2/2019 10:44 PM                 (Please note that portions ofthis note were completed with a voice recognition program.  Efforts were made to edit the dictations but occasionally words are mis-transcribed.)    MORA Fine (electronically signed)          MORA Low  06/02/19 7840

## 2019-06-03 NOTE — ED NOTES
Reviewed discharge instructions with patient. No questions at this time. No sign of distress. AOx3. Pt ambulated to ER exit with steady gait.  Pt states pt's mother will be giving her a ride home       Aristides Ku RN  06/02/19 0039

## 2019-08-26 ENCOUNTER — APPOINTMENT (OUTPATIENT)
Dept: GENERAL RADIOLOGY | Age: 29
End: 2019-08-26
Payer: COMMERCIAL

## 2019-08-26 ENCOUNTER — HOSPITAL ENCOUNTER (EMERGENCY)
Age: 29
Discharge: HOME | End: 2019-08-26
Payer: COMMERCIAL

## 2019-08-26 VITALS
WEIGHT: 236 LBS | RESPIRATION RATE: 17 BRPM | HEIGHT: 59 IN | SYSTOLIC BLOOD PRESSURE: 114 MMHG | DIASTOLIC BLOOD PRESSURE: 80 MMHG | TEMPERATURE: 98.2 F | HEART RATE: 88 BPM | OXYGEN SATURATION: 100 % | BODY MASS INDEX: 47.58 KG/M2

## 2019-08-26 DIAGNOSIS — J45.20 MILD INTERMITTENT ASTHMATIC BRONCHITIS WITHOUT COMPLICATION: Primary | ICD-10-CM

## 2019-08-26 LAB — S PYO AG THROAT QL: NEGATIVE

## 2019-08-26 PROCEDURE — 87880 STREP A ASSAY W/OPTIC: CPT

## 2019-08-26 PROCEDURE — 6370000000 HC RX 637 (ALT 250 FOR IP): Performed by: NURSE PRACTITIONER

## 2019-08-26 PROCEDURE — 99284 EMERGENCY DEPT VISIT MOD MDM: CPT

## 2019-08-26 PROCEDURE — 71046 X-RAY EXAM CHEST 2 VIEWS: CPT

## 2019-08-26 PROCEDURE — 87081 CULTURE SCREEN ONLY: CPT

## 2019-08-26 PROCEDURE — 93005 ELECTROCARDIOGRAM TRACING: CPT | Performed by: NURSE PRACTITIONER

## 2019-08-26 PROCEDURE — 94640 AIRWAY INHALATION TREATMENT: CPT

## 2019-08-26 RX ORDER — DOXYCYCLINE HYCLATE 100 MG
100 TABLET ORAL 2 TIMES DAILY
Qty: 14 TABLET | Refills: 0 | Status: SHIPPED | OUTPATIENT
Start: 2019-08-26 | End: 2019-09-02

## 2019-08-26 RX ORDER — PREDNISONE 20 MG/1
TABLET ORAL
Qty: 18 TABLET | Refills: 0 | Status: SHIPPED | OUTPATIENT
Start: 2019-08-26 | End: 2019-09-05

## 2019-08-26 RX ORDER — GUAIFENESIN AND CODEINE PHOSPHATE 100; 10 MG/5ML; MG/5ML
5 SOLUTION ORAL 3 TIMES DAILY PRN
Qty: 100 ML | Refills: 0 | Status: SHIPPED | OUTPATIENT
Start: 2019-08-26 | End: 2019-08-29

## 2019-08-26 RX ORDER — MONTELUKAST SODIUM 10 MG/1
10 TABLET ORAL NIGHTLY
Qty: 30 TABLET | Refills: 0 | Status: SHIPPED | OUTPATIENT
Start: 2019-08-26

## 2019-08-26 RX ORDER — IPRATROPIUM BROMIDE AND ALBUTEROL SULFATE 2.5; .5 MG/3ML; MG/3ML
1 SOLUTION RESPIRATORY (INHALATION) ONCE
Status: COMPLETED | OUTPATIENT
Start: 2019-08-26 | End: 2019-08-26

## 2019-08-26 RX ORDER — PREDNISONE 20 MG/1
60 TABLET ORAL ONCE
Status: COMPLETED | OUTPATIENT
Start: 2019-08-26 | End: 2019-08-26

## 2019-08-26 RX ADMIN — PREDNISONE 60 MG: 20 TABLET ORAL at 19:47

## 2019-08-26 RX ADMIN — IPRATROPIUM BROMIDE AND ALBUTEROL SULFATE 1 AMPULE: .5; 3 SOLUTION RESPIRATORY (INHALATION) at 19:56

## 2019-08-26 ASSESSMENT — PAIN DESCRIPTION - ONSET
ONSET: ON-GOING
ONSET: ON-GOING

## 2019-08-26 ASSESSMENT — PAIN DESCRIPTION - LOCATION
LOCATION: CHEST
LOCATION: CHEST

## 2019-08-26 ASSESSMENT — ENCOUNTER SYMPTOMS
SHORTNESS OF BREATH: 1
CHEST TIGHTNESS: 1
GASTROINTESTINAL NEGATIVE: 1
WHEEZING: 1
COUGH: 1
EYES NEGATIVE: 1
RHINORRHEA: 1

## 2019-08-26 ASSESSMENT — PAIN DESCRIPTION - ORIENTATION
ORIENTATION: LEFT
ORIENTATION: LEFT

## 2019-08-26 ASSESSMENT — PAIN SCALES - GENERAL
PAINLEVEL_OUTOF10: 8
PAINLEVEL_OUTOF10: 5

## 2019-08-26 ASSESSMENT — PAIN DESCRIPTION - PAIN TYPE
TYPE: ACUTE PAIN
TYPE: ACUTE PAIN

## 2019-08-26 ASSESSMENT — PAIN DESCRIPTION - FREQUENCY
FREQUENCY: CONTINUOUS
FREQUENCY: CONTINUOUS

## 2019-08-26 ASSESSMENT — PAIN DESCRIPTION - DESCRIPTORS
DESCRIPTORS: TIGHTNESS
DESCRIPTORS: TIGHTNESS

## 2019-08-26 ASSESSMENT — PAIN DESCRIPTION - PROGRESSION
CLINICAL_PROGRESSION: GRADUALLY IMPROVING
CLINICAL_PROGRESSION: NOT CHANGED

## 2019-08-26 NOTE — ED PROVIDER NOTES
such as CT, Ultrasound and MRI are read by the radiologist. Plain radiographic images are visualized and preliminarily interpreted by GEMMA Montenegro CNP with the below findings:        Interpretation per the Radiologist below, if available at the time of this note:    XR CHEST STANDARD (2 VW)   Final Result   No acute process. EKG interpreted per Dr. Jose Chavarria and reviewed per myself normal sinus rhythm. Narrow complex. Negative for ST elevation or depression. Due to the room that the EKG was obtained and there is some voltage interference noted and lead I to aVR, lead III and aVF. No evidence of STEMI. No evidence of Brugada. No evidence of right heart strain. Ventricular rate 94, MA interval 132, QRS 82, . LABS:  Labs Reviewed   STREP SCREEN GROUP A THROAT    Narrative:     Performed at:  [address] [address]   Phone [phone]   CULTURE BETA STREP CONFIRM PLATE       All other labs were within normal range or not returned as of this dictation. EMERGENCY DEPARTMENT COURSE and DIFFERENTIAL DIAGNOSIS/MDM:   Vitals:    Vitals:    08/26/19 2000 08/26/19 2015 08/26/19 2030 08/26/19 2100   BP:   116/81 114/80   Pulse: 89 86 97 88   Resp: 10 11 16 17   Temp:    98.2 °F (36.8 °C)   TempSrc:    Oral   SpO2:    100%   Weight:       Height:         Medications   predniSONE (DELTASONE) tablet 60 mg (60 mg Oral Given 8/26/19 1947)   ipratropium-albuterol (DUONEB) nebulizer solution 1 ampule (1 ampule Inhalation Given 8/26/19 1956)         MDM    EKG was obtained per nursing protocol. She was seen and evaluated per myself. Dr. Rosi Hilario was present available for consultation as needed. Clinical exam the patient is awake alert and oriented. She is basically having asthmatic bronchitis clinically. She has some upper respiratory symptoms as well to include rhinorrhea nasal mucosal thickening postnasal drip.

## 2019-08-27 LAB
EKG ATRIAL RATE: 94 BPM
EKG DIAGNOSIS: NORMAL
EKG P AXIS: 62 DEGREES
EKG P-R INTERVAL: 132 MS
EKG Q-T INTERVAL: 382 MS
EKG QRS DURATION: 82 MS
EKG QTC CALCULATION (BAZETT): 477 MS
EKG R AXIS: 0 DEGREES
EKG T AXIS: 35 DEGREES
EKG VENTRICULAR RATE: 94 BPM

## 2019-08-27 PROCEDURE — 93010 ELECTROCARDIOGRAM REPORT: CPT | Performed by: INTERNAL MEDICINE

## 2019-08-28 LAB — S PYO THROAT QL CULT: NORMAL

## 2024-06-14 ENCOUNTER — TELEPHONE (OUTPATIENT)
Dept: PULMONOLOGY | Age: 34
End: 2024-06-14

## 2025-09-05 ENCOUNTER — APPOINTMENT (OUTPATIENT)
Dept: GENERAL RADIOLOGY | Age: 35
End: 2025-09-05
Payer: COMMERCIAL

## 2025-09-05 ENCOUNTER — HOSPITAL ENCOUNTER (EMERGENCY)
Age: 35
Discharge: HOME | End: 2025-09-06
Attending: STUDENT IN AN ORGANIZED HEALTH CARE EDUCATION/TRAINING PROGRAM
Payer: COMMERCIAL

## 2025-09-05 ENCOUNTER — APPOINTMENT (OUTPATIENT)
Dept: CT IMAGING | Age: 35
End: 2025-09-05
Payer: COMMERCIAL

## 2025-09-05 VITALS
WEIGHT: 244.8 LBS | RESPIRATION RATE: 18 BRPM | TEMPERATURE: 98.2 F | HEART RATE: 94 BPM | DIASTOLIC BLOOD PRESSURE: 82 MMHG | OXYGEN SATURATION: 100 % | BODY MASS INDEX: 49.35 KG/M2 | HEIGHT: 59 IN | SYSTOLIC BLOOD PRESSURE: 149 MMHG

## 2025-09-05 DIAGNOSIS — S70.11XA CONTUSION OF RIGHT THIGH, INITIAL ENCOUNTER: ICD-10-CM
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
## 2025-09-05 DIAGNOSIS — S16.1XXA ACUTE STRAIN OF NECK MUSCLE, INITIAL ENCOUNTER: ICD-10-CM

## 2025-09-05 DIAGNOSIS — S80.00XA CONTUSION OF KNEE, UNSPECIFIED LATERALITY, INITIAL ENCOUNTER: ICD-10-CM
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
## 2025-09-05 DIAGNOSIS — S09.90XA CLOSED HEAD INJURY, INITIAL ENCOUNTER: Primary | ICD-10-CM

## 2025-09-05 PROCEDURE — 73030 X-RAY EXAM OF SHOULDER: CPT

## 2025-09-05 PROCEDURE — 72125 CT NECK SPINE W/O DYE: CPT

## 2025-09-05 PROCEDURE — 73564 X-RAY EXAM KNEE 4 OR MORE: CPT

## 2025-09-05 PROCEDURE — 73060 X-RAY EXAM OF HUMERUS: CPT

## 2025-09-05 PROCEDURE — 70450 CT HEAD/BRAIN W/O DYE: CPT

## 2025-09-05 PROCEDURE — 6370000000 HC RX 637 (ALT 250 FOR IP): Performed by: PHYSICIAN ASSISTANT

## 2025-09-05 PROCEDURE — 73552 X-RAY EXAM OF FEMUR 2/>: CPT

## 2025-09-05 RX ORDER — OXYCODONE AND ACETAMINOPHEN 5; 325 MG/1; MG/1
1 TABLET ORAL ONCE
Refills: 0 | Status: COMPLETED | OUTPATIENT
Start: 2025-09-05 | End: 2025-09-05

## 2025-09-05 RX ADMIN — OXYCODONE AND ACETAMINOPHEN 1 TABLET: 5; 325 TABLET ORAL at 22:46

## 2025-09-05 ASSESSMENT — LIFESTYLE VARIABLES
HOW MANY STANDARD DRINKS CONTAINING ALCOHOL DO YOU HAVE ON A TYPICAL DAY: 1 OR 2
HOW OFTEN DO YOU HAVE A DRINK CONTAINING ALCOHOL: MONTHLY OR LESS

## 2025-09-05 ASSESSMENT — ENCOUNTER SYMPTOMS
BACK PAIN: 0
EYE PAIN: 0
SHORTNESS OF BREATH: 0
NAUSEA: 0
EYE REDNESS: 0
EYE ITCHING: 0
VOMITING: 0
EYE DISCHARGE: 0
ABDOMINAL PAIN: 0
PHOTOPHOBIA: 0

## 2025-09-05 ASSESSMENT — PAIN - FUNCTIONAL ASSESSMENT
PAIN_FUNCTIONAL_ASSESSMENT: 0-10
PAIN_FUNCTIONAL_ASSESSMENT: 0-10

## 2025-09-05 ASSESSMENT — PAIN SCALES - GENERAL
PAINLEVEL_OUTOF10: 8
PAINLEVEL_OUTOF10: 9

## 2025-09-05 ASSESSMENT — VISUAL ACUITY: OU: 1

## 2025-09-06 RX ORDER — LIDOCAINE 50 MG/G
1 PATCH TOPICAL DAILY
Qty: 10 PATCH | Refills: 0 | Status: SHIPPED | OUTPATIENT
Start: 2025-09-06 | End: 2025-09-16

## 2025-09-06 RX ORDER — IBUPROFEN 600 MG/1
600 TABLET, FILM COATED ORAL 3 TIMES DAILY PRN
Qty: 30 TABLET | Refills: 0 | Status: SHIPPED | OUTPATIENT
Start: 2025-09-06

## 2025-09-06 RX ORDER — ACETAMINOPHEN 500 MG
1000 TABLET ORAL 3 TIMES DAILY
Qty: 60 TABLET | Refills: 0 | Status: SHIPPED | OUTPATIENT
Start: 2025-09-06 | End: 2025-09-16

## 2025-09-06 ASSESSMENT — PAIN SCALES - GENERAL: PAINLEVEL_OUTOF10: 8
